# Patient Record
Sex: FEMALE | NOT HISPANIC OR LATINO | URBAN - METROPOLITAN AREA
[De-identification: names, ages, dates, MRNs, and addresses within clinical notes are randomized per-mention and may not be internally consistent; named-entity substitution may affect disease eponyms.]

---

## 2021-11-29 ENCOUNTER — EMERGENCY (EMERGENCY)
Facility: HOSPITAL | Age: 66
LOS: 1 days | Discharge: HOME | End: 2021-11-29
Attending: EMERGENCY MEDICINE
Payer: MEDICARE

## 2021-11-29 VITALS
TEMPERATURE: 99 F | WEIGHT: 110.01 LBS | OXYGEN SATURATION: 99 % | HEIGHT: 60 IN | RESPIRATION RATE: 16 BRPM | HEART RATE: 92 BPM | DIASTOLIC BLOOD PRESSURE: 89 MMHG | SYSTOLIC BLOOD PRESSURE: 144 MMHG

## 2021-11-29 VITALS
DIASTOLIC BLOOD PRESSURE: 81 MMHG | OXYGEN SATURATION: 97 % | HEART RATE: 85 BPM | SYSTOLIC BLOOD PRESSURE: 104 MMHG | RESPIRATION RATE: 16 BRPM | TEMPERATURE: 99 F

## 2021-11-29 PROCEDURE — 99284 EMERGENCY DEPT VISIT MOD MDM: CPT

## 2021-11-29 RX ORDER — ERYTHROMYCIN BASE 5 MG/GRAM
1 OINTMENT (GRAM) OPHTHALMIC (EYE) ONCE
Refills: 0 | Status: COMPLETED | OUTPATIENT
Start: 2021-11-29 | End: 2021-11-29

## 2021-11-29 RX ORDER — VALACYCLOVIR 500 MG/1
1 TABLET, FILM COATED ORAL
Qty: 30 | Refills: 0
Start: 2021-11-29 | End: 2021-12-08

## 2021-11-29 RX ORDER — VALACYCLOVIR 500 MG/1
500 TABLET, FILM COATED ORAL ONCE
Refills: 0 | Status: COMPLETED | OUTPATIENT
Start: 2021-11-29 | End: 2021-11-29

## 2021-11-29 RX ORDER — ERYTHROMYCIN BASE 5 MG/GRAM
1 OINTMENT (GRAM) OPHTHALMIC (EYE)
Qty: 3 | Refills: 0
Start: 2021-11-29 | End: 2021-12-08

## 2021-11-29 RX ORDER — VANCOMYCIN HCL 1 G
1 VIAL (EA) INTRAVENOUS
Refills: 0 | Status: DISCONTINUED | OUTPATIENT
Start: 2021-11-29 | End: 2021-12-03

## 2021-11-29 RX ORDER — TOBRAMYCIN SULFATE 40 MG/ML
1 VIAL (ML) INJECTION
Refills: 0 | Status: DISCONTINUED | OUTPATIENT
Start: 2021-11-29 | End: 2021-12-03

## 2021-11-29 RX ADMIN — Medication 1 DROP(S): at 23:26

## 2021-11-29 RX ADMIN — Medication 1 APPLICATION(S): at 23:31

## 2021-11-29 RX ADMIN — Medication 1 DROP(S): at 23:19

## 2021-11-29 RX ADMIN — Medication 100 MILLIGRAM(S): at 23:18

## 2021-11-29 RX ADMIN — VALACYCLOVIR 500 MILLIGRAM(S): 500 TABLET, FILM COATED ORAL at 23:18

## 2021-11-29 NOTE — CONSULT NOTE ADULT - SUBJECTIVE AND OBJECTIVE BOX
[facility] DEPARTMENT OF OPHTHALMOLOGY - INITIAL ADULT CONSULT  -----------------------------------------------------------------------------  Constance Ross MD, MPH, PGY-3  Pager: [phone]  -----------------------------------------------------------------------------  NOTE NOT UPDATED     HPI:    Interval History: ***    PMH: ***  POcHx: denies surg/laser  FH: denies glc/amd  Social History: denies etoh/tobacco  Ophthalmic Medications: none  Allergies: NKDA    Review of Systems:  Constitutional: No fever, chills  Eyes: No blurry vision, flashes, floaters, FBS, erythema, discharge, double vision, OU  Neuro: No tremors  Cardiovascular: No chest pain, palpitations  Respiratory: No SOB, no cough  GI: No nausea, vomiting, abdominal pain  : No dysuria  Skin: no rash  Psych: no depression  Endocrine: no polyuria, polydipsia  Heme/lymph: no swelling    VITALS: T(C): 37.1 (11-29-21 @ 19:27)  T(F): 98.7 (11-29-21 @ 19:27), Max: 98.7 (11-29-21 @ 19:27)  HR: 92 (11-29-21 @ 19:27) (92 - 92)  BP: 144/89 (11-29-21 @ 19:27) (144/89 - 144/89)  RR:  (16 - 16)  SpO2:  (99% - 99%)  Wt(kg): --  General: AAO x 3, appropriate mood and affect    Ophthalmology Exam:  Visual acuity (sc): 20/20 OU  Pupils: PERRL OU, no APD  Ttono: 16 OU  Extraocular movements (EOMs): Full OU, no pain, no diplopia  Confrontational Visual Field (CVF): Full OU  Color Plates: 12/12 OU    Pen Light Exam (PLE)  External: Flat OU  Lids/Lashes/Lacrimal Ducts: Flat OU    Sclera/Conjunctiva: W+Q OU  Cornea: Cl OU  Anterior Chamber: D+F OU    Iris: Flat OU  Lens: Cl OU    Fundus Exam: dilated with 1% tropicamide and 2.5% phenylephrine  Approval obtained from primary team for dilation  Patient aware that pupils can remained dilated for at least 4-6 hours  Exam performed with 20D lens    Vitreous: wnl OU  Disc, cup/disc: sharp and pink, 0.4 OU  Macula: wnl OU  Vessels: wnl OU  Periphery: wnl OU    Labs/Imaging:  *** [facility] DEPARTMENT OF OPHTHALMOLOGY - INITIAL ADULT CONSULT  -----------------------------------------------------------------------------  Constance Ross MD, MPH, PGY-3  Pager: [phone]  -----------------------------------------------------------------------------    HPI: 67 yo F no reported past med history (though BP in triage 144/89) or ocular history presenting to the ED after seeing attending ophthalmologist Dr. Benitez ([facility]) for concern of left eye perforation. Pt states that she had eye pain and itching OS over the last two days that progressively worsened, prompting her employer to send her to Dr. Benitez.     Pt reports decreased vision in her left eye over the past 5 years. Reports that when she was a child her brother poked her left eye with a crayon but her vision was fine afterwards. Denies recent trauma, exposure to organic plant material, water sources. Reports no rashes, no new fevers, and no history of autoimmune disease or malignancy. Used clear eyes PRN without improvement.     PMH: per HPI   POcHx: denies surg/laser  FH: denies glc/amd  Social History: denies etoh/tobacco  Ophthalmic Medications: clear eyes PRN  Allergies: NKDA    Review of Systems:  Constitutional: No fever, chills  Eyes: + blurred vision, no flashes, floaters, FBS, + erythema, + discharge, no double vision, OU  Neuro: No tremors  Cardiovascular: No chest pain, palpitations  Respiratory: No SOB, no cough  GI: No nausea, vomiting, abdominal pain  : No dysuria  Skin: no rash  Psych: no depression  Endocrine: no polyuria, polydipsia  Heme/lymph: no swelling    VITALS: T(C): 37.1 (11-29-21 @ 19:27)  T(F): 98.7 (11-29-21 @ 19:27), Max: 98.7 (11-29-21 @ 19:27)  HR: 92 (11-29-21 @ 19:27) (92 - 92)  BP: 144/89 (11-29-21 @ 19:27) (144/89 - 144/89)  RR:  (16 - 16)  SpO2:  (99% - 99%)  Wt(kg): --  General: AAO x 3, appropriate mood and affect    Ophthalmology Exam:  Visual acuity (cc): 20/30+2 OD, CF at face with no PHI OS   Pupils: PERRL OU, no APD (poor view OS though pupil overall appears round and reactive)  Ttono: 11/8   Extraocular movements (EOMs): Full OU, no pain, no diplopia  Confrontational Visual Field (CVF): Full OU    Slit Lamp Exam (SLE)   External: Flat OU  Lids/Lashes/Lacrimal Ducts: Flat OU    Sclera/Conjunctiva: W+Q OD, 2+ injection with scant mucopurulent discharge OS   Cornea: Cl OD, inferotemporal cornea edema with endothelial folds, suspicious dense area of haze concerning for infiltrate OS measuring 2.0 x 2.6mm with corresponding epithelial defect. No evidence corneal thinning. Intact corneal sensation OS.   Anterior Chamber: D+F OD, 1+ cell/flare OS   Iris: Flat OD, iris appears to be bowing outward OS inferior edge   Lens: NS OU    Fundus Exam: dilated with 1% tropicamide and 2.5% phenylephrine  Approval obtained from primary team for dilation  Patient aware that pupils can remained dilated for at least 4-6 hours  Exam performed with 20D lens    Vitreous: wnl OD  Disc, cup/disc: sharp and pink, 0.4 OD  Macula: flat OD   Vessels: attenuated OD  Periphery: several DBH, nasal>temporal OD    B-scan OS (poor view in s/o corneal haze): no evidence vitritis, masses, retinal tears or detachments.     Labs/Imaging:  Cultures pending     Assessment and Recommendations:  67 yo F no reported past med history (though BP in triage 144/89) or ocular history presenting to the ED after seeing attending ophthalmologist Dr. Benietz ([facility]) for concern of left eye perforation.     #Infiltrate OS   - Suspicious for infectious process given degree of injection and pain, mild AC reaction   - Possible history of prior trauma (iris appears distorted OS) though very unclear history - pt reports decreased vision over the past 5 years   - Cultures taken, results pending   - START fortified vancomycin and tobramycin - every 30 min, space out by five minutes   - START erythromycin ointment QID - given drops before ointment  - START valtrex 500mg TID for coverage of HSV   - START doxycycline 100mg BID and vitamin C 1G QD   - Patient will be seen tomorrow - evaluation by cornea specialist     Findings discussed with pt and primary team.     Case SDW Dr. Alexander, Chief. DW Dr. Nassar, attending.     Outpatient follow-up: Patient should follow-up with his/her ophthalmologist or with [facility] Department of Ophthalmology within 1 week of after discharge at:    [address]  [place] [address]  [phone]    Constance Ross MD, MPH PGY-3  Pager: [phone]  Google Voice: [phone]   Also available on Microsoft Teams [facility] DEPARTMENT OF OPHTHALMOLOGY - INITIAL ADULT CONSULT  -----------------------------------------------------------------------------  Constance Ross MD, MPH, PGY-3  Pager: [phone]  -----------------------------------------------------------------------------    HPI: 67 yo F no reported past med history (though BP in triage 144/89) or ocular history presenting to the ED after seeing attending ophthalmologist Dr. Benitez ([facility]) for concern of left eye perforation. Pt states that she had eye pain and itching OS over the last two days that progressively worsened, prompting her employer to send her to Dr. Benitez.     Pt reports decreased vision in her left eye over the past 5 years. Reports that when she was a child her brother poked her left eye with a crayon but her vision was fine afterwards. Denies recent trauma, exposure to organic plant material, water sources. Reports no rashes, no new fevers, and no history of autoimmune disease or malignancy. Used clear eyes PRN without improvement.     PMH: per HPI   POcHx: denies surg/laser  FH: denies glc/amd  Social History: denies etoh/tobacco  Ophthalmic Medications: clear eyes PRN  Allergies: NKDA    Review of Systems:  Constitutional: No fever, chills  Eyes: + blurred vision, no flashes, floaters, FBS, + erythema, + discharge, no double vision, OU  Neuro: No tremors  Cardiovascular: No chest pain, palpitations  Respiratory: No SOB, no cough  GI: No nausea, vomiting, abdominal pain  : No dysuria  Skin: no rash  Psych: no depression  Endocrine: no polyuria, polydipsia  Heme/lymph: no swelling    VITALS: T(C): 37.1 (11-29-21 @ 19:27)  T(F): 98.7 (11-29-21 @ 19:27), Max: 98.7 (11-29-21 @ 19:27)  HR: 92 (11-29-21 @ 19:27) (92 - 92)  BP: 144/89 (11-29-21 @ 19:27) (144/89 - 144/89)  RR:  (16 - 16)  SpO2:  (99% - 99%)  Wt(kg): --  General: AAO x 3, appropriate mood and affect    Ophthalmology Exam:  Visual acuity (cc): 20/30+2 OD, CF at face with no PHI OS   Pupils: PERRL OU, no APD (poor view OS though pupil overall appears round and reactive)  Ttono: 11/8   Extraocular movements (EOMs): Full OU, no pain, no diplopia  Confrontational Visual Field (CVF): Full OU    Slit Lamp Exam (SLE)   External: Flat OU  Lids/Lashes/Lacrimal Ducts: Flat OU    Sclera/Conjunctiva: W+Q OD, 2+ injection with scant mucopurulent discharge OS   Cornea: Cl OD, inferotemporal cornea edema with endothelial folds, suspicious dense area of haze concerning for infiltrate OS measuring 2.0 x 2.6mm with corresponding epithelial defect. No evidence corneal thinning. Intact corneal sensation OS.   Anterior Chamber: D+F OD, 1+ cell/flare OS   Iris: Flat OD, iris appears to be bowing outward OS inferior edge   Lens: NS OU    Fundus Exam: dilated with 1% tropicamide and 2.5% phenylephrine  Approval obtained from primary team for dilation  Patient aware that pupils can remained dilated for at least 4-6 hours  Exam performed with 20D lens    Vitreous: wnl OD  Disc, cup/disc: sharp and pink, 0.4 OD  Macula: flat OD   Vessels: attenuated OD  Periphery: several DBH, nasal>temporal OD    B-scan OS (poor view in s/o corneal haze): no evidence vitritis, masses, retinal tears or detachments.     Labs/Imaging:  Cultures pending     Assessment and Recommendations:  67 yo F no reported past med history (though BP in triage 144/89) or ocular history presenting to the ED after seeing attending ophthalmologist Dr. Benitez ([facility]) for concern of left eye perforation.     #Infiltrate OS   - Suspicious for infectious process given degree of injection and pain, mild AC reaction   - Possible history of prior trauma (iris appears distorted OS) though very unclear history - pt reports decreased vision over the past 5 years   - Cultures taken, results pending   - START fortified vancomycin and tobramycin - every 30 min, space out by five minutes   - START erythromycin ointment QID - given drops before ointment  - START valtrex 500mg TID for coverage of HSV   - START doxycycline 100mg BID and vitamin C 1G QD   - B-scan ultrasound copies placed in patient's chart   - Patient will be seen tomorrow - evaluation by cornea specialist     #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     Case SDW Dr. Alexander, Chief. EASTON Nassar, attending.     Outpatient follow-up: Patient should follow-up with his/her ophthalmologist or with [facility] Department of Ophthalmology within 1 week of after discharge at:    [address]  [place] [address]  [phone]    Constance Ross MD, MPH PGY-3  Pager: [phone]  Google Voice: [phone]   Also available on Microsoft Teams

## 2021-11-29 NOTE — ED PROVIDER NOTE - PATIENT PORTAL LINK FT
You can access the FollowMyHealth Patient Portal offered by [facility] by registering at the following website: [email or web address]. By joining [employer]’s FollowMyHealth portal, you will also be able to view your health information using other applications (apps) compatible with our system.

## 2021-11-29 NOTE — ED ADULT TRIAGE NOTE - CHIEF COMPLAINT QUOTE
left eye burning and redness since Sunday. Patient went to onomatologist and was told to come to the hospital today. left eye burning, watery discharge, and redness since Sunday. Patient went to ophthalmologist  and was told to come to the hospital today.

## 2021-11-29 NOTE — ED CLERICAL - NS ED CLERK NOTE PRE-ARRIVAL INFORMATION; ADDITIONAL PRE-ARRIVAL INFORMATION
CC/Reason For referral: perforated eyeball  Preferred Consultant(if applicable):  Who admits for you (if needed):  Do you have documents you would like to fax over?  Would you still like to speak to an ED attending? No

## 2021-11-29 NOTE — ED PROVIDER NOTE - OBJECTIVE STATEMENT
56y F no reported sig pmhx, wears glasses no contacts, presents to ED c/o L eye pain/burning, redness, watery discharge, blurred vision since yesterday. Seen by ophthalmologist Dr. Edil Benitez and referred to ED for eval. No trauma. No hx of eye surgeries. Denies f/c. 56y F no reported sig pmhx, wears glasses no contacts, presents to ED c/o L eye pain/burning, redness, watery discharge, blurred vision since yesterday. Seen by ophthalmologist Dr. Edil Benitez and referred to ED for eval. No trauma. No hx of eye surgeries. Denies f/c.      Attn - pt seen in FT eye room - pt c/o loss vision left eye with clouding and pain x 2 days.  pt seen at Ophthalmology clinic and sent to ER for evaluation.  pt wears glasses for reading and distance.  no eye trauma.  no h/a or n/v.  denies prior ophtho hx.

## 2021-11-29 NOTE — ED PROVIDER NOTE - PHYSICAL EXAMINATION
GEN: Pt non-toxic in NAD, A&Ox3.  PSYCH: Affect and mood appropriate.  EYES: Sclera white w/o injection, EOMI, PERRLA. OS: Sclera injected, hazy cornea, VA unattainable. OD: Sclera white, pupil rrla, VA 20/20.  ENT: Head NCAT. Neck supple FROM. Airway patent.  RESP: No distress.  ABD: Abdomen soft, non-tender. No CVAT b/l.  MSK: Moving all extremities. No deficits.  VASC: Radial pulses 2+ b/l. No edema.  SKIN: No rashes or lesions. GEN: Pt non-toxic in NAD, A&Ox3.  PSYCH: Affect and mood appropriate.  EYES: Sclera white w/o injection, EOMI, PERRLA. OS: Sclera injected, hazy cornea, VA unattainable. OD: Sclera white, pupil rrla, VA 20/20.  ENT: Head NCAT. Neck supple FROM. Airway patent.  RESP: No distress.  ABD: Abdomen soft, non-tender. No CVAT b/l.  MSK: Moving all extremities. No deficits.  VASC: Radial pulses 2+ b/l. No edema.  SKIN: No rashes or lesions.     attn - alert, mild distress.  left eye - L/L/L - crusting of lids, S/C - injected, Cornea - clouded,  visual acuity hand mvm.  pressure 10

## 2021-11-29 NOTE — ED PROVIDER NOTE - PROGRESS NOTE DETAILS
Richar Disla PA-C: s/w carroll will see shortly in ED Richar Disla PA-C: Ophtho res took pt to eye room Richar Disla PA-C: first round of medications given in ED. DC instructions reviewed multiple times. [facility] will [facility] pt tmrw for maria g.

## 2021-11-29 NOTE — ED ADULT NURSE NOTE - CHIEF COMPLAINT QUOTE
left eye burning, watery discharge, and redness since Sunday. Patient went to ophthalmologist  and was told to come to the hospital today.

## 2021-11-29 NOTE — ED ADULT NURSE NOTE - OBJECTIVE STATEMENT
65 y/o female coming to the ER with c/o left eye pain. A&Ox4. Ambulatory. No significant PMH. Patient reports yesterday she began to have redness, pain, and increased drainage from the left eye. Patient was seen by an eye doctor today who was told to come to the ER. Patient has no paperwork from the eye doctor. Patient's left eye appears red and swollen with a small amount of yellow drainage. Patient endorses some blurry vision in the left eye. Patient states she was given eye drops from the doctor that improved the pain. Patient denies chest pain, SOB, fever, headache, dizziness, chills, n/v/d, urinary symptoms, abdominal pain. Safety measures maintained.  MD at the bedside. No acute distress noted or further complaints at this time.

## 2021-11-29 NOTE — ED PROVIDER NOTE - CLINICAL SUMMARY MEDICAL DECISION MAKING FREE TEXT BOX
Attn - acute vision loss in left eye x 2 days with pain and clouded cornea and normal pressure - Ophtho consult.

## 2021-11-29 NOTE — ED ADULT NURSE REASSESSMENT NOTE - NS ED NURSE REASSESS COMMENT FT1
Patient d/c. Reviewed d/c paperwork with patients, all questions answered at this time. Patient verbalizes understanding.  Patient instructed to return to the ER for any worsening s/s including chest pain, SOB, fever, n/v/d. Patient alert and stable at time of d/c. Patient educated on dc medications. Patient will follow up with opthalmology tomorrow.

## 2021-11-29 NOTE — ED PROVIDER NOTE - NSFOLLOWUPINSTRUCTIONS_ED_ALL_ED_FT
1) Follow-up with your primary care provider in 1-2 days.      Follow-up with Ophthalmology Clinic tomorrow. They will call you for your appointment.    Apply Vancomycin 1 drop every 30 minutes  Apply Tobramycin 1 drop every 30 minutes  Space out the above drops by 5 minutes    Apply Erythromycin ointment 4 times a day. Always apply drops before ointment.    Take Doxycycline 100mg every 12 hours    Take Valtrex 500mg every 8 hours    2) Continue to take all medications as prescribed.    3) Rest and drink plenty of fluids. Pain can be managed with Acetaminophen (aka Tylenol) and Ibuprofen (aka Motrin or Advil) over the counter as directed. Take with food.    4) Return to the ER for any new or worsening symptoms. 1) Follow-up with your primary care provider in 1-2 days.      Follow-up with Ophthalmology Clinic tomorrow. They will call you for your appointment.    Apply Vancomycin 1 drop every 30 minutes  Apply Tobramycin 1 drop every 30 minutes  Space out the above drops by 5 minutes    Apply Erythromycin ointment 4 times a day. Always apply ointment AFTER DROPS.    Take Doxycycline 100mg every 12 hours    Take Valtrex 500mg every 8 hours    2) Continue to take all medications as prescribed.    3) Rest and drink plenty of fluids. Pain can be managed with Acetaminophen (aka Tylenol) and Ibuprofen (aka Motrin or Advil) over the counter as directed. Take with food.    4) Return to the ER for any new or worsening symptoms.

## 2021-11-29 NOTE — ED PROVIDER NOTE - RAPID ASSESSMENT
56y F p/w L eye watery discharge, burning, and redness since yesterday. Seen by optho and referred to ED. Pt now reporting new blurry vision beginning today.    Patient was seen as a tele QDOC patient. The patient will be seen and further worked up in the main emergency department and their care will be completed by the main emergency department team along with a thorough physical exam. Receiving team will follow up on labs, analgesia, any clinical imaging, reassess and disposition as clinically indicated, all decisions regarding the progression of care will be made at their discretion.    Scribe Statement: Stella MAXWELL, attest that this documentation has been prepared under the direction and in the presence of María Bustamante (DO) 56y F p/w L eye watery discharge, burning, and redness since yesterday. Seen by optho and referred to ED. Pt now reporting new blurry vision beginning today.    Patient was seen as a tele QDOC patient. The patient will be seen and further worked up in the main emergency department and their care will be completed by the main emergency department team along with a thorough physical exam. Receiving team will follow up on labs, analgesia, any clinical imaging, reassess and disposition as clinically indicated, all decisions regarding the progression of care will be made at their discretion.    Scribe Statement: I, Stella Payne, attest that this documentation has been prepared under the direction and in the presence of María Bustamante (DO)  I, Dr. Bustamante, personally performed the service described in the documentation recorded by the scribe in my presence, and it accurately and completely records my words and actions.

## 2021-11-30 PROCEDURE — 99284 EMERGENCY DEPT VISIT MOD MDM: CPT

## 2021-11-30 PROCEDURE — 87077 CULTURE AEROBIC IDENTIFY: CPT

## 2021-11-30 PROCEDURE — 87102 FUNGUS ISOLATION CULTURE: CPT

## 2021-11-30 PROCEDURE — 87186 SC STD MICRODIL/AGAR DIL: CPT

## 2021-11-30 PROCEDURE — 87070 CULTURE OTHR SPECIMN AEROBIC: CPT

## 2021-12-04 LAB
-  AMPICILLIN/SULBACTAM: SIGNIFICANT CHANGE UP
-  CEFAZOLIN: SIGNIFICANT CHANGE UP
-  CLINDAMYCIN: SIGNIFICANT CHANGE UP
-  ERYTHROMYCIN: SIGNIFICANT CHANGE UP
-  GENTAMICIN: SIGNIFICANT CHANGE UP
-  OXACILLIN: SIGNIFICANT CHANGE UP
-  PENICILLIN: SIGNIFICANT CHANGE UP
-  RIFAMPIN: SIGNIFICANT CHANGE UP
-  TETRACYCLINE: SIGNIFICANT CHANGE UP
-  TRIMETHOPRIM/SULFAMETHOXAZOLE: SIGNIFICANT CHANGE UP
-  VANCOMYCIN: SIGNIFICANT CHANGE UP
CULTURE RESULTS: SIGNIFICANT CHANGE UP
METHOD TYPE: SIGNIFICANT CHANGE UP
ORGANISM # SPEC MICROSCOPIC CNT: SIGNIFICANT CHANGE UP
ORGANISM # SPEC MICROSCOPIC CNT: SIGNIFICANT CHANGE UP
SPECIMEN SOURCE: SIGNIFICANT CHANGE UP

## 2021-12-29 LAB
CULTURE RESULTS: SIGNIFICANT CHANGE UP
SPECIMEN SOURCE: SIGNIFICANT CHANGE UP